# Patient Record
Sex: MALE | URBAN - METROPOLITAN AREA
[De-identification: names, ages, dates, MRNs, and addresses within clinical notes are randomized per-mention and may not be internally consistent; named-entity substitution may affect disease eponyms.]

---

## 2024-08-22 ENCOUNTER — APPOINTMENT (RX ONLY)
Dept: URBAN - METROPOLITAN AREA CLINIC 80 | Facility: CLINIC | Age: 59
Setting detail: DERMATOLOGY
End: 2024-08-22

## 2024-08-22 DIAGNOSIS — L81.1 CHLOASMA: ICD-10-CM

## 2024-08-22 PROCEDURE — ? PRESCRIPTION

## 2024-08-22 PROCEDURE — ? TREATMENT REGIMEN

## 2024-08-22 PROCEDURE — 99204 OFFICE O/P NEW MOD 45 MIN: CPT

## 2024-08-22 PROCEDURE — ? COUNSELING

## 2024-08-22 RX ORDER — PHARMACY COMPOUNDING ACCESSORY
EACH MISCELLANEOUS QD
Qty: 30 | Refills: 2 | Status: ERX | COMMUNITY
Start: 2024-08-22

## 2024-08-22 RX ADMIN — Medication: at 00:00

## 2024-08-22 ASSESSMENT — LOCATION SIMPLE DESCRIPTION DERM
LOCATION SIMPLE: LEFT CHEEK
LOCATION SIMPLE: RIGHT CHEEK

## 2024-08-22 ASSESSMENT — LOCATION DETAILED DESCRIPTION DERM
LOCATION DETAILED: RIGHT LATERAL MALAR CHEEK
LOCATION DETAILED: LEFT CENTRAL MALAR CHEEK

## 2024-08-22 ASSESSMENT — LOCATION ZONE DERM: LOCATION ZONE: FACE

## 2024-08-22 ASSESSMENT — SEVERITY ASSESSMENT: SEVERITY: MODERATE, MODERATELY DARKER THAN THE SURROUNDING NORMAL SKIN

## 2024-08-22 NOTE — PROCEDURE: TREATMENT REGIMEN
Otc Regimen: Discussed using Tranexamic acid once comes off Hydroquinone compound
Detail Level: Zone

## 2024-10-22 ENCOUNTER — APPOINTMENT (RX ONLY)
Dept: URBAN - METROPOLITAN AREA CLINIC 80 | Facility: CLINIC | Age: 59
Setting detail: DERMATOLOGY
End: 2024-10-22

## 2024-10-22 DIAGNOSIS — L81.1 CHLOASMA: ICD-10-CM | Status: IMPROVED

## 2024-10-22 PROCEDURE — 99213 OFFICE O/P EST LOW 20 MIN: CPT

## 2024-10-22 PROCEDURE — ? TREATMENT REGIMEN

## 2024-10-22 PROCEDURE — ? COUNSELING

## 2024-10-22 ASSESSMENT — LOCATION DETAILED DESCRIPTION DERM
LOCATION DETAILED: RIGHT CENTRAL MALAR CHEEK
LOCATION DETAILED: LEFT CENTRAL MALAR CHEEK

## 2024-10-22 ASSESSMENT — LOCATION ZONE DERM: LOCATION ZONE: FACE

## 2024-10-22 ASSESSMENT — LOCATION SIMPLE DESCRIPTION DERM
LOCATION SIMPLE: LEFT CHEEK
LOCATION SIMPLE: RIGHT CHEEK

## 2024-10-22 NOTE — PROCEDURE: TREATMENT REGIMEN
Otc Regimen: Discussed using Tranexamic acid once comes off Hydroquinone compound
Detail Level: Zone
Plan: Patient has improved on compound surgery.
Continue Regimen: pharmacy compounding accessory Qd\\nQuantity: 30.0 g  Days Supply: 30\\nSig: Compound: Hydroquinone 4%/ Hydrocortisone 1% / Tretinoin cream 0.05 apply nightly as tolerated for three months, then take off one month and repeat.

## 2025-04-22 ENCOUNTER — APPOINTMENT (OUTPATIENT)
Dept: URBAN - METROPOLITAN AREA CLINIC 80 | Facility: CLINIC | Age: 60
Setting detail: DERMATOLOGY
End: 2025-04-22

## 2025-04-22 DIAGNOSIS — L81.1 CHLOASMA: ICD-10-CM | Status: WELL CONTROLLED

## 2025-04-22 PROCEDURE — ? COUNSELING

## 2025-04-22 PROCEDURE — ? PRESCRIPTION MEDICATION MANAGEMENT

## 2025-04-22 PROCEDURE — 99214 OFFICE O/P EST MOD 30 MIN: CPT

## 2025-04-22 ASSESSMENT — LOCATION DETAILED DESCRIPTION DERM
LOCATION DETAILED: RIGHT CENTRAL MALAR CHEEK
LOCATION DETAILED: LEFT CENTRAL MALAR CHEEK

## 2025-04-22 ASSESSMENT — LOCATION ZONE DERM: LOCATION ZONE: FACE

## 2025-04-22 ASSESSMENT — LOCATION SIMPLE DESCRIPTION DERM
LOCATION SIMPLE: LEFT CHEEK
LOCATION SIMPLE: RIGHT CHEEK

## 2025-04-22 ASSESSMENT — SEVERITY ASSESSMENT: SEVERITY: MODERATE, MODERATELY DARKER THAN THE SURROUNDING NORMAL SKIN

## 2025-04-22 NOTE — PROCEDURE: PRESCRIPTION MEDICATION MANAGEMENT
Render In Strict Bullet Format?: No
Plan: Recommended to start OTC Tranexamic acid cream on the off month. Discussed laser treatments in the future if no further improvement.
Detail Level: Zone
Continue Regimen: pharmacy compounding accessory Qd\\nSig: Compound: Hydroquinone 4%/ Hydrocortisone 1% / Tretinoin cream 0.05 apply nightly as tolerated for three months, then take off one month and repeat.